# Patient Record
Sex: MALE | Race: AMERICAN INDIAN OR ALASKA NATIVE
[De-identification: names, ages, dates, MRNs, and addresses within clinical notes are randomized per-mention and may not be internally consistent; named-entity substitution may affect disease eponyms.]

---

## 2018-08-04 ENCOUNTER — HOSPITAL ENCOUNTER (EMERGENCY)
Dept: HOSPITAL 5 - ED | Age: 58
Discharge: HOME | End: 2018-08-04
Payer: SELF-PAY

## 2018-08-04 VITALS — SYSTOLIC BLOOD PRESSURE: 149 MMHG | DIASTOLIC BLOOD PRESSURE: 77 MMHG
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
## 2018-08-04 DIAGNOSIS — S81.811A: Primary | ICD-10-CM

## 2018-08-04 DIAGNOSIS — I10: ICD-10-CM
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
## 2018-08-04 LAB
ALBUMIN SERPL-MCNC: 4.1 G/DL (ref 3.9–5)
ALT SERPL-CCNC: 17 UNITS/L (ref 7–56)
APTT BLD: 32.5 SEC. (ref 24.2–36.6)
BASOPHILS # (AUTO): 0 K/MM3 (ref 0–0.1)
BASOPHILS NFR BLD AUTO: 0.6 % (ref 0–1.8)
BUN SERPL-MCNC: 9 MG/DL (ref 9–20)
BUN/CREAT SERPL: 9 %
CALCIUM SERPL-MCNC: 9.7 MG/DL (ref 8.4–10.2)
EOSINOPHIL # BLD AUTO: 0.1 K/MM3 (ref 0–0.4)
EOSINOPHIL NFR BLD AUTO: 1.6 % (ref 0–4.3)
HCT VFR BLD CALC: 37.3 % (ref 35.5–45.6)
HEMOLYSIS INDEX: 7
HGB BLD-MCNC: 13.4 GM/DL (ref 11.8–15.2)
INR PPP: 0.98 (ref 0.87–1.13)
LYMPHOCYTES # BLD AUTO: 3 K/MM3 (ref 1.2–5.4)
LYMPHOCYTES NFR BLD AUTO: 38.2 % (ref 13.4–35)
MCH RBC QN AUTO: 33 PG (ref 28–32)
MCHC RBC AUTO-ENTMCNC: 36 % (ref 32–34)
MCV RBC AUTO: 93 FL (ref 84–94)
MONOCYTES # (AUTO): 0.6 K/MM3 (ref 0–0.8)
MONOCYTES % (AUTO): 7 % (ref 0–7.3)
PLATELET # BLD: 255 K/MM3 (ref 140–440)
RBC # BLD AUTO: 4.01 M/MM3 (ref 3.65–5.03)

## 2018-08-04 PROCEDURE — 85730 THROMBOPLASTIN TIME PARTIAL: CPT

## 2018-08-04 PROCEDURE — 96375 TX/PRO/DX INJ NEW DRUG ADDON: CPT

## 2018-08-04 PROCEDURE — 73706 CT ANGIO LWR EXTR W/O&W/DYE: CPT

## 2018-08-04 PROCEDURE — 90715 TDAP VACCINE 7 YRS/> IM: CPT

## 2018-08-04 PROCEDURE — 96365 THER/PROPH/DIAG IV INF INIT: CPT

## 2018-08-04 PROCEDURE — 36415 COLL VENOUS BLD VENIPUNCTURE: CPT

## 2018-08-04 PROCEDURE — 90471 IMMUNIZATION ADMIN: CPT

## 2018-08-04 PROCEDURE — 80053 COMPREHEN METABOLIC PANEL: CPT

## 2018-08-04 PROCEDURE — 73590 X-RAY EXAM OF LOWER LEG: CPT

## 2018-08-04 PROCEDURE — 85610 PROTHROMBIN TIME: CPT

## 2018-08-04 PROCEDURE — 12001 RPR S/N/AX/GEN/TRNK 2.5CM/<: CPT

## 2018-08-04 PROCEDURE — 99284 EMERGENCY DEPT VISIT MOD MDM: CPT

## 2018-08-04 PROCEDURE — 85025 COMPLETE CBC W/AUTO DIFF WBC: CPT

## 2018-08-04 RX ADMIN — Medication ONE APPLIC: at 11:00

## 2018-08-04 RX ADMIN — Medication ONE APPLIC: at 12:00

## 2018-08-04 NOTE — EMERGENCY DEPARTMENT REPORT
- General


Chief Complaint: Wound/Laceration


Stated Complaint: EXCESSIVE BLEEDING FROM RIGHT LEG


Time Seen by Provider: 08/04/18 10:08


Source: patient


Mode of arrival: Ambulatory


Limitations: No Limitations





- History of Present Illness


Initial Comments: 





Patient said that this morning a piece of glass broke and struck him in the 

right mid lower leg.  He was bleeding and immediately and came to the emergency 

room.


-: Sudden, This morning


Location: other (Right leg)


Extremity Location: Right: Lower Leg (Mid lower leg)


Place: home


Patient Tetanus UTD: No


Context: accidental


Associated Symptoms: pain, other (Bleeding)


Treatments Prior to Arrival: bandage





- Related Data


 Previous Rx's











 Medication  Instructions  Recorded  Last Taken  Type


 


Hydrochlorothiazide [HCTZ] 25 mg PO QDAY #30 tablet 08/04/18 Unknown Rx


 


Mupirocin [Bactroban 2%] 1 applic TP TID #1 tube 08/04/18 Unknown Rx


 


Sulfamethoxazole/Trimethoprim 1 each PO BID #20 tablet 08/04/18 Unknown Rx





[Bactrim DS TAB]    


 


traMADol [Ultram 50 MG tab] 50 mg PO BID #10 tablet 08/04/18 Unknown Rx











 Allergies











Allergy/AdvReac Type Severity Reaction Status Date / Time


 


No Known Allergies Allergy   Unverified 08/04/18 10:04














ED Review of Systems


ROS: 


Stated complaint: EXCESSIVE BLEEDING FROM RIGHT LEG


Other details as noted in HPI





Comment: All other systems reviewed and negative


Constitutional: denies: chills, fever


Eyes: denies: eye pain, eye discharge


ENT: denies: ear pain, throat pain


Respiratory: denies: cough, shortness of breath


Cardiovascular: denies: chest pain, palpitations


Endocrine: no symptoms reported


Gastrointestinal: denies: abdominal pain, nausea, vomiting, diarrhea


Genitourinary: denies: urgency, dysuria, frequency


Skin: lesions, other (Laceration).  denies: rash


Neurological: denies: headache, weakness, numbness


Psychiatric: denies: anxiety, depression


Hematological/Lymphatic: denies: easy bleeding, easy bruising





ED Past Medical Hx





- Past Medical History


Previous Medical History?: Yes


Hx Hypertension: Yes





- Social History


Smoking Status: Never Smoker


Substance Use Type: None





- Medications


Home Medications: 


 Home Medications











 Medication  Instructions  Recorded  Confirmed  Last Taken  Type


 


Hydrochlorothiazide [HCTZ] 25 mg PO QDAY #30 tablet 08/04/18  Unknown Rx


 


Mupirocin [Bactroban 2%] 1 applic TP TID #1 tube 08/04/18  Unknown Rx


 


Sulfamethoxazole/Trimethoprim 1 each PO BID #20 tablet 08/04/18  Unknown Rx





[Bactrim DS TAB]     


 


traMADol [Ultram 50 MG tab] 50 mg PO BID #10 tablet 08/04/18  Unknown Rx














ED Physical Exam





- General


Limitations: No Limitations


General appearance: alert, in no apparent distress





- Head


Head exam: Present: atraumatic, normocephalic, normal inspection





- Eye


Eye exam: Present: normal appearance, PERRL, EOMI


Pupils: Present: normal accommodation





- ENT


ENT exam: Present: normal exam, normal orophraynx, mucous membranes moist





- Neck


Neck exam: Present: normal inspection, full ROM.  Absent: tenderness





- Respiratory


Respiratory exam: Present: normal lung sounds bilaterally.  Absent: respiratory 

distress, wheezes, rales, rhonchi





- Cardiovascular


Cardiovascular Exam: Present: regular rate, normal rhythm, normal heart sounds





- GI/Abdominal


GI/Abdominal exam: Present: soft, normal bowel sounds.  Absent: distended, 

tenderness, guarding, rebound





- Extremities Exam


Extremities exam: Present: full ROM, normal capillary refill, other (Right 

lateral mid lower leg 1.5 cm and 1 cm lacerations.)





- Back Exam


Back exam: Present: normal inspection, full ROM.  Absent: tenderness





- Neurological Exam


Neurological exam: Present: alert, oriented X3, CN II-XII intact





- Psychiatric


Psychiatric exam: Present: normal affect, normal mood





- Skin


Skin exam: Present: warm, dry, intact, normal color, other (laceration with 

bleeding controlled.)





ED Course


 Vital Signs











  08/04/18 08/04/18 08/04/18





  10:02 10:04 11:37


 


Temperature 98.6 F  


 


Pulse Rate 67 61 55 L


 


Respiratory 20 18 18





Rate   


 


Blood Pressure 147/73  


 


Blood Pressure  147/73 149/77





[Left]   


 


O2 Sat by Pulse 98 99 99





Oximetry   














- Reevaluation(s)


Reevaluation #1: 





08/04/18 14:07


I consulted the general surgeon on-call Dr Santacruz.  He came down to the 

emergency room and evaluated the patient in the emergency room.  He said that 

this is not appendicitis and he wants me to discharge the patient home.  He was 

patient to follow up in his clinic next week.  I will go ahead and discharge 

patient home according to the recommendation of the general surgeon Dr Santacruz.

  Please refer to Dr. Santacruz consult note for full details of the consult.





- Laceration /Wound Repair


  ** Right Lower Lateral Leg


Wound Location: lower extremity (Right lateral)


Wound Length (cm): 2 (1.5 cm and 1 cm)


Wound's Depth, Shape: into muscle


Wound Explored: clean


Irrigated w/ Saline (ccs): 250


Betadine Prep?: Yes


Number of Sutures: 3 (Staples)


Layer Closure?: No


Sterile Dressing Applied?: Yes


Progress: 





Patient tolerated the procedure well.





ED Medical Decision Making





- Lab Data


Result diagrams: 


 08/04/18 10:25





 08/04/18 10:25





- Radiology Data


Radiology results: report reviewed, image reviewed





- Medical Decision Making





Right Leg Laceration.


Critical care attestation.: 


If time is entered above; I have spent that time in minutes in the direct care 

of this critically ill patient, excluding procedure time.








ED Disposition


Clinical Impression: 


Laceration of right lower leg without foreign body


Qualifiers:


 Encounter type: initial encounter Qualified Code(s): S81.811A - Laceration 

without foreign body, right lower leg, initial encounter





Disposition: DC-01 TO HOME OR SELFCARE


Is pt being admited?: No


Does the pt Need Aspirin: No


Condition: Stable


Instructions:  Suture Care (ED), Laceration (ED)


Additional Instructions: 


Please follow up with the general surgeon Dr Santacruz by calling his out patient 

clinic on Monday for an appointment. Office phone number: [phone].


Return to the emergency room if her condition worsens or if you start having 

any severe abdominal pain.


Prescriptions: 


Hydrochlorothiazide [HCTZ] 25 mg PO QDAY #30 tablet


Mupirocin [Bactroban 2%] 1 applic TP TID #1 tube


Sulfamethoxazole/Trimethoprim [Bactrim DS TAB] 1 each PO BID #20 tablet


traMADol [Ultram 50 MG tab] 50 mg PO BID #10 tablet


Referrals: 


PRIMARY CARE,MD [Primary Care Provider] - 3-5 Days


BENITA SANTACRUZ MD [Staff Physician] - 3-5 Days


Time of Disposition: 14:10

## 2018-08-04 NOTE — CAT SCAN REPORT
FINAL REPORT



EXAM:  CT ANGIO LOWER EXTREMITY RT



HISTORY:  Right leg laceration at the mid portion of tibia 



COMPARISON:  Radiographs of the right tibia and fibula performed

on 08/04/2018



TECHNIQUE:  Multiple contiguous axial images were obtained from

the lower abdomen to the bilateral lower extremities after

administration of IV contrast. Reformatted coronal and sagittal

images were available for review. Maximal intensity coronal and

sagittal reformatted images were also provided. 



FINDINGS:  

The abdominal aorta is normal in appearance without dissection or

aneurysm. The celiac axis, superior mesenteric artery, and

inferior mesenteric artery are patent and normal in caliber. The

bilateral renal arteries are patent and normal in caliber. The

bilateral iliac arteries are patent and normal in caliber.



The right external iliac artery is patent and normal in caliber.

The right common femoral artery, femoral artery, profunda branch,

popliteal artery and tibioperoneal trunk are patent and normal in

caliber. There is normal triple-vessel flow into the ankle. There

is no evidence of active extravasation of contrast to suggest

vascular injury. 



The left external iliac artery is patent and normal in caliber.

The left common femoral artery, femoral artery, profunda branch,

popliteal artery and peroneal trunk are patent and normal in

caliber. There is normal triple-vessel flow into the ankle. 



Musculature of the bilateral lower extremities is normal in

appearance and is symmetric. There is no focal fluid collection R

mass. There are skin staples or sutures over the lateral aspect

of the right lower leg. There is no definite foreign body. 



The visualized portions of the liver, pancreas, spleen, adrenal

glands, and kidneys are normal in appearance. 



There is dilatation of the tip of the appendix, measuring up to 1

centimeter with an associated appendicolith. There is

indistinctness of the border of the tip of the appendix and there

is some free fluid measuring approximately 7.6 x 2.8 by 4.6

centimeters in the right lower quadrant. 



The remainder of the bowel is normal in appearance without

obstruction. 



The bones are normal. 



IMPRESSION:  





1. Normal CT angiogram of the abdomen and bilateral lower

extremities. No evidence of active extravasation to suggest

arterial injury. 

2. Concern for acute appendicitis with focal perforation with a

large amount of free fluid in the right lower quadrant. No

loculated or rim enhancing collection to suggest abscess. 

3. Subcutaneous staples along the soft tissues of the right

lateral tibia. No radiopaque foreign body. 



Findings were discussed with Dr. Martin at 1:30 p.m., Eastern

standard time, on 08/04/2018.

## 2018-08-04 NOTE — XRAY REPORT
FINAL REPORT



EXAM:  XR TIBIA FIBULA 2V RT



HISTORY:  Possible foreign body 



COMPARISON:  None.



TECHNIQUE:  Four views of the right tibia and fibula



FINDINGS:  

There is normal alignment without acute fracture or dislocation.

The joint spaces are preserved. The overlying soft tissues are

intact. There is no radiopaque foreign body.



IMPRESSION:  

No acute bony abnormality of the right tibia and fibula.



No radiopaque foreign body.

## 2018-08-04 NOTE — CONSULTATION
HISTORY OF PRESENT ILLNESS:  I was called by our ER doctor to see this man.  He

is a 58-year-old who apparently had a trauma to his right leg area.  He had a

wound from a glass that had him come to the ER for further evaluation.  The

wound was stapled in the ER and apparently he was _____ me because of ? pain in

the right lower quadrant.  He had no nausea, no vomiting.  His appetite is very

good as he told me.  The patient is not allergic to any medicines.  There is ?

diabetes.



PHYSICAL EXAMINATION:

GENERAL:  At this point showed a thin, slim black male who is in no distress. 

He was eating when I saw him.

HEAD AND NECK:  Negative.

CHEST:  Clear.

HEART:  Sound noted.

ABDOMEN:  Flat, soft, benign, no evidence of any tenderness.

EXTREMITIES:  Showed no edema.  He had a bandage over his right lower extremity

in the mid leg area.



I believe this need to be addressed by him, see me in the office in about 2

weeks or 10 days to remove the staples.



I indicated to the patient that if he has any pain to the right lower quadrant

to give me a call.





DD: 08/04/2018 13:59

DT: 08/04/2018 19:44

JOB# [id number]  [id number]

SHAY/ELIF